# Patient Record
(demographics unavailable — no encounter records)

---

## 2024-11-13 NOTE — ASSESSMENT
[FreeTextEntry1] : -Patient was visiting DR 3/4/2019 when he had a heart attack, recalls creat was 2.60, vacilated about doing angiogram bc of CKD, post PTCA was 4.1 and has been improving since; creatinine improved from 4 while in DR s/p PTCA to 0.7-1.06, prior to surgery in 3/2022 cr was ~ 1.7,  - returned to reestablish care in 3/2023  ( was last seenin 2019),was s/p repair of ventricular aneurysm in 3/3022, - followed by [facility],  olmesartan and metoprolol were stopped and he was started on carvedilol 25mg bid, , chlorthalidone 50mg, and candesartan - amlodipine was also discontinued  11/2024 HTN - cont  carvedilol 25 bid, amlodipine 5 daily, chlorthalidone 25, olmesartan 40, and farxiga  CKD 3 - best since 3/2023 - 2/2 HTN, DM, as well as issues surrounding MI/PTCA and repair of AAA in 4/2022 - cont farxiga  Obesity - cont diet, excercise, and farxiga - lost 15 lbs from 11/2023 to present after starting farxiga  Iron deficiency - seen by GI, had colonoscopy  GI, PCP, [facility] notes reviewed  - counseled on weight loss, exercise, controlling DM, HTN, avoidance of NSAIDs , Uptn/creatinine 1.32  recheck Uptn/cr, iron panel, uric acid. Now back on candesartan.

## 2024-11-13 NOTE — HISTORY OF PRESENT ILLNESS
[FreeTextEntry1] : Pt returned to reestablish care  in 3/2023, prior to that was last seen in 2019  -Patient was visiting DR 3/4/2019 when he had a heart attack, recalls creat was 2.60, vacillated about doing angiogram bc of CKD, post PTCA was 4.1 and has been improving since; creatinine improved from 4 while in  s/p PTCA to 0.7-1.06   11/2024 - here for f/u YEVGENIY onCKD, HTN,  - no new complaints - BP at goal - tolerating farxiga - taking carvedilol 25 bid, amlodipine 5 daily, chlorthalidone 25, olmesartan 40, and farxiga - lost 15 lbs from 11/2023 to present after starting farxiga  7/2024 - here for f/u YEVGENIY onCKD, HTN,  - taking carvedilol 25 bid, amlodipine 5 daily, chlorthalidone 25, olmesartan 40, and farxiga - loast 15 lbs from 11/2023 to preent after starting farxiga  3/2023  s/p repair of ventricular aneurysm in 3/3022, concerned b/c BP elevated - was seen by [facility] yesterday, states olmesartan and mtp was stopped, started on carvedilol, amlodipine, ,HCTZ,   11/2023 - here for f/u - has regained ~30lbs over past year - unclear as to why, but amlodipine was discontinued - BP elevated

## 2024-11-13 NOTE — PHYSICAL EXAM
[General Appearance - Alert] : alert [General Appearance - Well Nourished] : well nourished [Sclera] : the sclera and conjunctiva were normal [Extraocular Movements] : extraocular movements were intact [FreeTextEntry1] : pallor [Outer Ear] : the ears and nose were normal in appearance [Hearing Threshold Finger Rub Not [place]] : hearing was normal [Neck Appearance] : the appearance of the neck was normal [Jugular Venous Distention Increased] : there was no jugular-venous distention [Exaggerated Use Of Accessory Muscles For Inspiration] : no accessory muscle use [Heart Sounds] : normal S1 and S2 [Edema] : there was no peripheral edema [Bowel Sounds] : normal bowel sounds [Abdomen Tenderness] : non-tender [Abnormal Walk] : normal gait [Nail Clubbing] : no clubbing  or cyanosis of the fingernails [Skin Color & Pigmentation] : normal skin color and pigmentation [] : no rash [Oriented To Time, Place, And Person] : oriented to person, place, and time [Affect] : the affect was normal [Mood] : the mood was normal

## 2024-11-18 NOTE — HISTORY OF PRESENT ILLNESS
[FreeTextEntry1] : for annual exam [de-identified] : 68 year male came for annual exam. Pt has h/o multiple medical issues HTN, HLD, DM, CKD, CAD f/u by outside Cardiologist Dr Waldrop Currently pt feels well, denies fever, headache, chest pain, palpitations, shortness of breath. Pt is accompanied by his wife who was involved in tx plan discussion based on pt's consent

## 2024-11-18 NOTE — HEALTH RISK ASSESSMENT
[Fair] :  ~his/her~ mood as fair [No] : No [0] : 2) Feeling down, depressed, or hopeless: Not at all (0) [Never] : Never [[id number]] : 0

## 2024-12-05 NOTE — PROCEDURE
[FreeTextEntry1] : Nails: Based on my physical examination and my clinical findings and the patient's description of the symptoms, a complete differential diagnosis was reviewed with the patient. Possible diagnoses as well as treatment options explained in great detail. All questions asked and answered appropriately. A lengthy and inform a discussion with the patient regarding different types of treatments for the mycotic nail disease. I stressed clinical versus mycologic cure rates and anticipated success rates regarding topical medications oral medications as well as laser therapy. I discussed in great length with the patient the success rates and success rates anticipated. There were no guarantees given regarding any of the treatment reviewed. I did explain to the patient that there are risks to oral antifungal therapy however those risks can be greatly decreased with obtaining blood tests prior and possibly during the treatment if indicated. The patient will weigh their options and contact the office Neuropathy had a lengthy discussion with the patient regarding the diagnosis etiology and differential diagnosis as well as treatment options for the presenting problem. Risks alternatives and benefits of treatment ranging from conservative to surgical explained in great detail. I also explained the progression of treatment from conservative to possible surgical treatment options as well as the benefits of each. I do stress conservative treatment if in fact conservative treatment is an option until it no longer provides relief. Over-the-counter products medications padding, and splinting were reviewed as well. All questions asked and answered appropriately to the patient's satisfaction I had a lengthy d/w the patient re: the use and benefits of various vitamins as a dietary supplement which can help treat their current condition. Educational literature supplied and all questions asked and answered appropriately. Suggestions of the type of vitamins recommended dispensed and advised to use on a consistent basis Using sterile instrumentation debridement of all nails manually and electrically to decrease thickness, pain and girth and make shoe gear more comfortable with "slant back" procedure of any bordering spicules causing pain

## 2024-12-05 NOTE — REVIEW OF SYSTEMS
[Leg Claudication] : no intermittent leg claudication [Lower Ext Edema] : no extremity edema [Negative] : Constitutional

## 2024-12-05 NOTE — PHYSICAL EXAM
[General Appearance - Alert] : alert [General Appearance - In No Acute Distress] : in no acute distress [FreeTextEntry3] : reduce nsaids to only once daily then stop and take only prn or only take OTC as needed [de-identified] : overall muscle strength testing is decreased, but consistant with the patients age and medical problems. Mild atrophy and overall weakness present. [FreeTextEntry1] : The patient has all contributing factors to onychomycosis including but not limited to thickness, subungual debris, discoloration and partial lysis and they are brittle when cut. [Motor Exam] : the motor exam was normal [Vibration Dec.] : diminished vibratory sensation at the level of the toes [Diminished Throughout Right Foot] : diminished sensation with monofilament testing throughout right foot [Diminished Throughout Left Foot] : diminished sensation with monofilament testing throughout left foot

## 2025-05-02 NOTE — INTERPRETER SERVICES
[Time Spent: ____ minutes] : Total time spent using  services: [unfilled] minutes. The patient's primary language is not English thus required  services. [Interpreters_IDNumber] : [id number] [Interpreters_FullName] : Scooter [TWNoteComboBox1] : Norwegian

## 2025-05-02 NOTE — ASSESSMENT
[FreeTextEntry1] : -Patient was visiting  3/4/2019 when he had a heart attack, recalls creat was 2.60, vacilated about doing angiogram bc of CKD, post PTCA was 4.1 and has been improving since; creatinine improved from 4 while in DR s/p PTCA to 0.7-1.06, prior to surgery in 3/2022 cr was ~ 1.7,  - returned to reestablish care in 3/2023  ( was last seenin 2019),was s/p repair of ventricular aneurysm in 3/3022, - followed by [facility],  olmesartan and metoprolol were stopped and he was started on carvedilol 25mg bid, , chlorthalidone 50mg, and candesartan - amlodipine was also discontinued  5/2025 CKD3 - 2/2 Dm/HTN - A1c elevated, counselled on diet, excercise, compliance with meds, advise he f/u with PCP/Endo, cont farxiga - advised that if he has contrast study, there is increased risk of YEVGENIY, to reduce risk should hold metformin, olmesartan, farxiga day before, day of and day after procedure and increase waterintake  HTN - at goal - cont  carvedilol 25 bid, amlodipine 5 daily, chlorthalidone 25, olmesartan 40, and farxiga  DM - recheck A1c - cont farxiga - reestablish care with Sonya dallas   11/2024 HTN - cont  carvedilol 25 bid, amlodipine 5 daily, chlorthalidone 25, olmesartan 40, and farxiga  CKD 3 - best since 3/2023 - 2/2 HTN, DM, as well as issues surrounding MI/PTCA and repair of AAA in 4/2022 - cont farxiga  Obesity - cont diet, excercise, and farxiga - lost 15 lbs from 11/2023 to present after starting farxiga  Iron deficiency - seen by GI, had colonoscopy  GI, PCP, [facility] notes reviewed  - counseled on weight loss, exercise, controlling DM, HTN, avoidance of NSAIDs , Uptn/creatinine 1.32  recheck Uptn/cr, iron panel, uric acid. Now back on candesartan.

## 2025-05-02 NOTE — HISTORY OF PRESENT ILLNESS
[de-identified] : Pt is a 69 oy M presenting to clinic for f/u. Pt is new to provider today.  Previously followed with Dr. Marrero as PCP.  Has felt well no changes in health   Notes never saw urologist- prior however with no urinary issues and no blood in urine  Sees Cardiologist in [facility]-   Appt with cardiologist is upcoming

## 2025-05-02 NOTE — HISTORY OF PRESENT ILLNESS
[FreeTextEntry1] : Pt returned to reestablish care  in 3/2023, prior to that was last seen in 2019  -Patient was visiting  3/4/2019 when he had a heart attack, recalls creat was 2.60, vacillated about doing angiogram bc of CKD, post PTCA was 4.1 and has been improving since; creatinine improved from 4 while in  s/p PTCA to 0.7-1.06   5/2025 - here for f/u YEVGENIY on CKD, HTN,  - no new complaints - reports cr in DR was 1.3 ( if so best in years), while there ( in [place]) was advised that he needs a contrast study for issue with eye, but was  told cr too high - BP at goal - tolerating farxiga - taking carvedilol 25 bid, amlodipine 5 daily, chlorthalidone 25, olmesartan 40, and farxiga - had lost 15 lbs from 11/2023 to present after starting farxiga, has regained - A1c 8  11/2024 - here for f/u YEVGENIY on CKD, HTN,  - no new complaints - BP at goal - tolerating farxiga - taking carvedilol 25 bid, amlodipine 5 daily, chlorthalidone 25, olmesartan 40, and farxiga - lost 15 lbs from 11/2023 to present after starting farxiga  7/2024 - here for f/u YEVGENIY on CKD, HTN,  - taking carvedilol 25 bid, amlodipine 5 daily, chlorthalidone 25, olmesartan 40, and farxiga - loast 15 lbs from 11/2023 to preent after starting farxiga  3/2023  s/p repair of ventricular aneurysm in 3/3022, concerned b/c BP elevated - was seen by [facility] yesterday, states olmesartan and mtp was stopped, started on carvedilol, amlodipine, ,HCTZ,   11/2023 - here for f/u - has regained ~30lbs over past year - unclear as to why, but amlodipine was discontinued - BP elevated

## 2025-05-02 NOTE — ASSESSMENT
[FreeTextEntry1] : Patient notes that he has never seen urologist however with no urinary issues at this time will discuss further in next visit about follow-up with urologist and at that time we will draw labs to assess PSA.

## 2025-05-02 NOTE — PHYSICAL EXAM
[General Appearance - Alert] : alert [General Appearance - Well Nourished] : well nourished [Sclera] : the sclera and conjunctiva were normal [Extraocular Movements] : extraocular movements were intact [Outer Ear] : the ears and nose were normal in appearance [Hearing Threshold Finger Rub Not [place]] : hearing was normal [Neck Appearance] : the appearance of the neck was normal [Jugular Venous Distention Increased] : there was no jugular-venous distention [Exaggerated Use Of Accessory Muscles For Inspiration] : no accessory muscle use [Heart Sounds] : normal S1 and S2 [Edema] : there was no peripheral edema [Bowel Sounds] : normal bowel sounds [Abdomen Tenderness] : non-tender [Abnormal Walk] : normal gait [Nail Clubbing] : no clubbing  or cyanosis of the fingernails [Skin Color & Pigmentation] : normal skin color and pigmentation [] : no rash [Oriented To Time, Place, And Person] : oriented to person, place, and time [Affect] : the affect was normal [Mood] : the mood was normal [FreeTextEntry1] : pallor